# Patient Record
Sex: MALE | URBAN - METROPOLITAN AREA
[De-identification: names, ages, dates, MRNs, and addresses within clinical notes are randomized per-mention and may not be internally consistent; named-entity substitution may affect disease eponyms.]

---

## 2018-09-05 ENCOUNTER — APPOINTMENT (RX ONLY)
Dept: URBAN - METROPOLITAN AREA CLINIC 128 | Facility: CLINIC | Age: 83
Setting detail: DERMATOLOGY
End: 2018-09-05

## 2018-09-05 DIAGNOSIS — L84 CORNS AND CALLOSITIES: ICD-10-CM

## 2018-09-05 DIAGNOSIS — Z71.89 OTHER SPECIFIED COUNSELING: ICD-10-CM

## 2018-09-05 DIAGNOSIS — I78.8 OTHER DISEASES OF CAPILLARIES: ICD-10-CM

## 2018-09-05 DIAGNOSIS — L85.3 XEROSIS CUTIS: ICD-10-CM

## 2018-09-05 DIAGNOSIS — L90.5 SCAR CONDITIONS AND FIBROSIS OF SKIN: ICD-10-CM

## 2018-09-05 DIAGNOSIS — L89 PRESSURE ULCER: ICD-10-CM

## 2018-09-05 DIAGNOSIS — D69.2 OTHER NONTHROMBOCYTOPENIC PURPURA: ICD-10-CM

## 2018-09-05 DIAGNOSIS — L82.1 OTHER SEBORRHEIC KERATOSIS: ICD-10-CM

## 2018-09-05 DIAGNOSIS — D485 NEOPLASM OF UNCERTAIN BEHAVIOR OF SKIN: ICD-10-CM

## 2018-09-05 PROBLEM — L89.891 PRESSURE ULCER OF OTHER SITE, STAGE 1: Status: ACTIVE | Noted: 2018-09-05

## 2018-09-05 PROBLEM — J45.909 UNSPECIFIED ASTHMA, UNCOMPLICATED: Status: ACTIVE | Noted: 2018-09-05

## 2018-09-05 PROBLEM — I48.91 UNSPECIFIED ATRIAL FIBRILLATION: Status: ACTIVE | Noted: 2018-09-05

## 2018-09-05 PROBLEM — J44.9 CHRONIC OBSTRUCTIVE PULMONARY DISEASE, UNSPECIFIED: Status: ACTIVE | Noted: 2018-09-05

## 2018-09-05 PROBLEM — D48.5 NEOPLASM OF UNCERTAIN BEHAVIOR OF SKIN: Status: ACTIVE | Noted: 2018-09-05

## 2018-09-05 PROBLEM — I25.10 ATHEROSCLEROTIC HEART DISEASE OF NATIVE CORONARY ARTERY WITHOUT ANGINA PECTORIS: Status: ACTIVE | Noted: 2018-09-05

## 2018-09-05 PROCEDURE — 99203 OFFICE O/P NEW LOW 30 MIN: CPT | Mod: 25

## 2018-09-05 PROCEDURE — ? COUNSELING

## 2018-09-05 PROCEDURE — ? SHAVE REMOVAL

## 2018-09-05 PROCEDURE — 11300 SHAVE SKIN LESION 0.5 CM/<: CPT

## 2018-09-05 PROCEDURE — ? TREATMENT REGIMEN

## 2018-09-05 PROCEDURE — ? DIAGNOSIS COMMENT

## 2018-09-05 ASSESSMENT — LOCATION SIMPLE DESCRIPTION DERM
LOCATION SIMPLE: RIGHT CHEEK
LOCATION SIMPLE: LEFT FOREARM
LOCATION SIMPLE: ABDOMEN
LOCATION SIMPLE: LEFT LOWER BACK
LOCATION SIMPLE: LEFT SHOULDER
LOCATION SIMPLE: LEFT PRETIBIAL REGION
LOCATION SIMPLE: LEFT ELBOW
LOCATION SIMPLE: LEFT THIGH
LOCATION SIMPLE: RIGHT FOREARM
LOCATION SIMPLE: LEFT CALF
LOCATION SIMPLE: RIGHT MIDDLE FINGER
LOCATION SIMPLE: NOSE
LOCATION SIMPLE: RIGHT HAND
LOCATION SIMPLE: RIGHT CALF
LOCATION SIMPLE: RIGHT PRETIBIAL REGION
LOCATION SIMPLE: LEFT CHEEK
LOCATION SIMPLE: LEFT HAND
LOCATION SIMPLE: CHEST

## 2018-09-05 ASSESSMENT — LOCATION DETAILED DESCRIPTION DERM
LOCATION DETAILED: LEFT LATERAL SUPERIOR CHEST
LOCATION DETAILED: XIPHOID
LOCATION DETAILED: RIGHT CENTRAL MALAR CHEEK
LOCATION DETAILED: RIGHT PROXIMAL DORSAL MIDDLE FINGER
LOCATION DETAILED: RIGHT RADIAL DORSAL HAND
LOCATION DETAILED: LEFT POSTERIOR SHOULDER
LOCATION DETAILED: LEFT ELBOW
LOCATION DETAILED: LEFT DISTAL DORSAL FOREARM
LOCATION DETAILED: LEFT CENTRAL MALAR CHEEK
LOCATION DETAILED: LEFT ANTERIOR DISTAL THIGH
LOCATION DETAILED: NASAL DORSUM
LOCATION DETAILED: RIGHT PROXIMAL PRETIBIAL REGION
LOCATION DETAILED: LEFT PROXIMAL PRETIBIAL REGION
LOCATION DETAILED: LEFT DISTAL CALF
LOCATION DETAILED: LEFT SUPERIOR LATERAL MIDBACK
LOCATION DETAILED: RIGHT DISTAL PRETIBIAL REGION
LOCATION DETAILED: RIGHT DISTAL CALF
LOCATION DETAILED: RIGHT PROXIMAL DORSAL FOREARM
LOCATION DETAILED: STERNUM
LOCATION DETAILED: LEFT RADIAL DORSAL HAND

## 2018-09-05 ASSESSMENT — LOCATION ZONE DERM
LOCATION ZONE: ARM
LOCATION ZONE: FINGER
LOCATION ZONE: NOSE
LOCATION ZONE: TRUNK
LOCATION ZONE: FACE
LOCATION ZONE: HAND
LOCATION ZONE: LEG

## 2018-09-05 NOTE — PROCEDURE: SHAVE REMOVAL
Biopsy Method: Dermablade
X Size Of Lesion In Cm (Optional): 0.3
Medical Necessity Information: It is in your best interest to select a reason for this procedure from the list below. All of these items fulfill various CMS LCD requirements except the new and changing color options.
Medical Necessity Clause: This procedure was medically necessary because the lesion that was treated was:
Lab: [facility]
Render Post-Care Instructions In Note?: no
Post-Care Instructions: I reviewed with the patient in detail post-care instructions. Patient is to keep the biopsy site dry overnight, and then apply bacitracin twice daily until healed. Patient may apply hydrogen peroxide soaks to remove any crusting.
Detail Level: Detailed
Notification Instructions: Patient will be notified of biopsy results. However, patient instructed to call the office if not contacted within 2 weeks.
Path Notes (To The Dermatopathologist): Please check margins.
Anesthesia Type: 1% lidocaine with epinephrine
Consent was obtained from the patient. The risks and benefits to therapy were discussed in detail. Specifically, the risks of infection, scarring, bleeding, prolonged wound healing, incomplete removal, allergy to anesthesia, nerve injury and recurrence were addressed. Prior to the procedure, the treatment site was clearly identified and confirmed by the patient. All components of Universal Protocol/PAUSE Rule completed.
Was A Bandage Applied: Yes
Anesthesia Volume In Cc: 0
Lab Facility: [facility]
Size Of Lesion In Cm (Required): 0.4
Hemostasis: Drysol
Billing Type: [employer]
Wound Care: Vaseline

## 2020-05-26 ENCOUNTER — NEW PATIENT EMERGENCY (OUTPATIENT)
Dept: URBAN - METROPOLITAN AREA CLINIC 32 | Facility: CLINIC | Age: 85
End: 2020-05-26

## 2020-05-26 DIAGNOSIS — H16.141: ICD-10-CM

## 2020-05-26 DIAGNOSIS — G51.0: ICD-10-CM

## 2020-05-26 DIAGNOSIS — H35.363: ICD-10-CM

## 2020-05-26 DIAGNOSIS — H16.223: ICD-10-CM

## 2020-05-26 PROCEDURE — 92134 CPTRZ OPH DX IMG PST SGM RTA: CPT

## 2020-05-26 PROCEDURE — 92004 COMPRE OPH EXAM NEW PT 1/>: CPT

## 2020-05-26 ASSESSMENT — TONOMETRY
OS_IOP_MMHG: 12
OD_IOP_MMHG: 11

## 2020-05-26 ASSESSMENT — VISUAL ACUITY
OS_CC: 20/30-1
OD_CC: 20/50-2

## 2020-07-27 ENCOUNTER — ESTABLISHED COMPREHENSIVE EXAM (OUTPATIENT)
Dept: URBAN - METROPOLITAN AREA CLINIC 32 | Facility: CLINIC | Age: 85
End: 2020-07-27

## 2020-07-27 DIAGNOSIS — Z96.1: ICD-10-CM

## 2020-07-27 DIAGNOSIS — H16.223: ICD-10-CM

## 2020-07-27 PROCEDURE — 92015 DETERMINE REFRACTIVE STATE: CPT

## 2020-07-27 PROCEDURE — 92014 COMPRE OPH EXAM EST PT 1/>: CPT

## 2020-07-27 ASSESSMENT — KERATOMETRY
OS_AXISANGLE2_DEGREES: 72
OD_AXISANGLE2_DEGREES: 86
OS_K2POWER_DIOPTERS: 42
OS_AXISANGLE_DEGREES: 162
OD_K1POWER_DIOPTERS: 43.25
OD_K2POWER_DIOPTERS: 41.5
OS_K1POWER_DIOPTERS: 43.75
OD_AXISANGLE_DEGREES: 176

## 2020-07-27 ASSESSMENT — TONOMETRY
OD_IOP_MMHG: 11
OS_IOP_MMHG: 11

## 2020-07-27 ASSESSMENT — VISUAL ACUITY
OS_CC: 20/25-2
OD_GLARE: <20/400
OS_GLARE: <20/400
OD_CC: 20/70

## 2021-10-29 ENCOUNTER — ESTABLISHED COMPREHENSIVE EXAM (OUTPATIENT)
Dept: URBAN - METROPOLITAN AREA CLINIC 32 | Facility: CLINIC | Age: 86
End: 2021-10-29

## 2021-10-29 DIAGNOSIS — H16.223: ICD-10-CM

## 2021-10-29 DIAGNOSIS — H35.363: ICD-10-CM

## 2021-10-29 PROCEDURE — 92014 COMPRE OPH EXAM EST PT 1/>: CPT

## 2021-10-29 PROCEDURE — 92134 CPTRZ OPH DX IMG PST SGM RTA: CPT

## 2021-10-29 ASSESSMENT — VISUAL ACUITY
OD_CC: J1
OS_CC: 20/25
OS_SC: 20/70
OS_SC: J16
OD_SC: 20/80-1
OD_SC: J16
OD_CC: 20/20-1
OS_CC: J1

## 2021-10-29 ASSESSMENT — KERATOMETRY
OS_AXISANGLE2_DEGREES: 75
OD_K1POWER_DIOPTERS: 44.25
OD_K2POWER_DIOPTERS: 41.25
OS_AXISANGLE_DEGREES: 165
OD_AXISANGLE2_DEGREES: 90
OS_K1POWER_DIOPTERS: 44.00
OS_K2POWER_DIOPTERS: 41.75
OD_AXISANGLE_DEGREES: 180

## 2021-10-29 ASSESSMENT — TONOMETRY
OS_IOP_MMHG: 12
OD_IOP_MMHG: 10

## 2023-08-08 ENCOUNTER — COMPREHENSIVE EXAM (OUTPATIENT)
Dept: URBAN - METROPOLITAN AREA CLINIC 32 | Facility: CLINIC | Age: 88
End: 2023-08-08

## 2023-08-08 DIAGNOSIS — H35.363: ICD-10-CM

## 2023-08-08 DIAGNOSIS — H16.223: ICD-10-CM

## 2023-08-08 DIAGNOSIS — Z96.1: ICD-10-CM

## 2023-08-08 PROCEDURE — 92134 CPTRZ OPH DX IMG PST SGM RTA: CPT

## 2023-08-08 PROCEDURE — 92015 DETERMINE REFRACTIVE STATE: CPT

## 2023-08-08 PROCEDURE — 99214 OFFICE O/P EST MOD 30 MIN: CPT

## 2023-08-08 ASSESSMENT — VISUAL ACUITY
OD_PH: 20/40-3
OS_PH: 20/50+2
OS_CC: J1+
OD_CC: J2
OD_CC: 20/60+1
OS_CC: 20/50+2

## 2023-08-08 ASSESSMENT — TONOMETRY
OS_IOP_MMHG: 10
OD_IOP_MMHG: 9

## 2023-08-08 ASSESSMENT — KERATOMETRY
OD_AXISANGLE2_DEGREES: 84
OS_AXISANGLE_DEGREES: 162
OD_K2POWER_DIOPTERS: 40.75
OD_K1POWER_DIOPTERS: 44.50
OS_K2POWER_DIOPTERS: 42
OS_AXISANGLE2_DEGREES: 72
OD_AXISANGLE_DEGREES: 174
OS_K1POWER_DIOPTERS: 45

## 2023-08-23 ENCOUNTER — TECH ONLY (OUTPATIENT)
Dept: URBAN - METROPOLITAN AREA CLINIC 32 | Facility: CLINIC | Age: 88
End: 2023-08-23

## 2023-08-23 DIAGNOSIS — Z96.1: ICD-10-CM

## 2023-08-23 PROCEDURE — 99211T TECH SERVICE

## 2023-08-23 ASSESSMENT — VISUAL ACUITY
OS_CC: 20/25
OD_CC: 20/40-1

## 2023-08-23 ASSESSMENT — KERATOMETRY
OS_K1POWER_DIOPTERS: 45
OS_AXISANGLE_DEGREES: 162
OS_K2POWER_DIOPTERS: 42
OD_K2POWER_DIOPTERS: 40.75
OD_K1POWER_DIOPTERS: 44.50
OS_AXISANGLE2_DEGREES: 72
OD_AXISANGLE2_DEGREES: 84
OD_AXISANGLE_DEGREES: 174